# Patient Record
Sex: FEMALE | Race: WHITE | Employment: PART TIME | URBAN - METROPOLITAN AREA
[De-identification: names, ages, dates, MRNs, and addresses within clinical notes are randomized per-mention and may not be internally consistent; named-entity substitution may affect disease eponyms.]

---

## 2020-06-30 ENCOUNTER — HOSPITAL ENCOUNTER (EMERGENCY)
Age: 20
Discharge: HOME | End: 2020-06-30
Attending: EMERGENCY MEDICINE
Payer: COMMERCIAL

## 2020-06-30 VITALS
SYSTOLIC BLOOD PRESSURE: 140 MMHG | BODY MASS INDEX: 18.61 KG/M2 | DIASTOLIC BLOOD PRESSURE: 88 MMHG | OXYGEN SATURATION: 95 % | TEMPERATURE: 97.9 F | RESPIRATION RATE: 18 BRPM | WEIGHT: 105 LBS | HEIGHT: 63 IN | HEART RATE: 68 BPM

## 2020-06-30 PROCEDURE — 99282 EMERGENCY DEPT VISIT SF MDM: CPT

## 2020-06-30 PROCEDURE — 6370000000 HC RX 637 (ALT 250 FOR IP): Performed by: EMERGENCY MEDICINE

## 2020-06-30 RX ORDER — METHYLPREDNISOLONE 4 MG/1
TABLET ORAL
Qty: 1 KIT | Refills: 0 | Status: SHIPPED | OUTPATIENT
Start: 2020-06-30

## 2020-06-30 RX ORDER — HYDROXYZINE HYDROCHLORIDE 25 MG/1
25 TABLET, FILM COATED ORAL ONCE
Status: COMPLETED | OUTPATIENT
Start: 2020-06-30 | End: 2020-06-30

## 2020-06-30 RX ORDER — LEVETIRACETAM 500 MG/1
500 TABLET ORAL 2 TIMES DAILY
COMMUNITY

## 2020-06-30 RX ORDER — PREDNISONE 20 MG/1
20 TABLET ORAL ONCE
Status: COMPLETED | OUTPATIENT
Start: 2020-06-30 | End: 2020-06-30

## 2020-06-30 RX ORDER — FAMOTIDINE 20 MG/1
20 TABLET, FILM COATED ORAL ONCE
Status: COMPLETED | OUTPATIENT
Start: 2020-06-30 | End: 2020-06-30

## 2020-06-30 RX ADMIN — HYDROXYZINE HYDROCHLORIDE 25 MG: 25 TABLET, FILM COATED ORAL at 18:45

## 2020-06-30 RX ADMIN — PREDNISONE 20 MG: 20 TABLET ORAL at 19:56

## 2020-06-30 RX ADMIN — FAMOTIDINE 20 MG: 20 TABLET ORAL at 18:45

## 2020-06-30 ASSESSMENT — ENCOUNTER SYMPTOMS
WHEEZING: 0
SHORTNESS OF BREATH: 1
SORE THROAT: 1
FACIAL SWELLING: 0
TROUBLE SWALLOWING: 1

## 2020-06-30 NOTE — ED PROVIDER NOTES
[facility] ED  eMERGENCY dEPARTMENT eNCOUnter      Pt Name: Masood Jones  MRN: [id number]  [place] 2000  Date of evaluation: 6/30/2020  Provider: Jed Stuart MD    CHIEF COMPLAINT       Chief Complaint   Patient presents with    Allergic Reaction     possible nut oil allergy, mouth tingles, hurts when she swallows         HISTORY OF PRESENT ILLNESS   (Location/Symptom, Timing/Onset,Context/Setting, Quality, Duration, Modifying Factors, Severity)  Note limiting factors. Masood Jones is a 23 y.o. female who presents to the emergency department with subjective symptoms of allergic reaction. After eating some fried chicken from [facility] ([place]) she felt a tingly sore sensation in the mouth and this feels somewhat short of breath. She took some Benadryl without change. Patient has history of peanut allergy diagnosed by testing secondary to family history of peanut allergy. She has never actually had a reaction herself. She checked with the restaurant and they told her that they did not utilize peanut oil or other nut oil product. He has no rash. No swelling to the mouth or face. He states she has had subjective feeling of allergic reaction similar to this but normally could \"calm herself down\" but not tonight. She admits to feeling anxious related to her symptoms. The history is provided by the patient. NursingNotes were reviewed. REVIEW OF SYSTEMS    (2-9 systems for level 4, 10 or more for level 5)     Review of Systems   HENT: Positive for sore throat and trouble swallowing. Negative for facial swelling. Respiratory: Positive for shortness of breath. Negative for wheezing. Skin: Negative for rash. Except as noted above the remainder of the review of systems was reviewed and negative. PAST MEDICAL HISTORY     Past Medical History:   Diagnosis Date    Epilepsy ([address]          SURGICALHISTORY     History reviewed. No pertinent surgical history.       CURRENT MEDICATIONS Previous Medications    LEVETIRACETAM (KEPPRA) 500 MG TABLET    Take 500 mg by mouth 2 times daily       ALLERGIES     Nuts [peanut-containing drug products]    FAMILY HISTORY     History reviewed. No pertinent family history. SOCIAL HISTORY       Social History     Socioeconomic History    Marital status: Single     Spouse name: None    Number of children: None    Years of education: None    Highest education level: None   Occupational History    None   Social Needs    Financial resource strain: None    Food insecurity     Worry: None     Inability: None    Transportation needs     Medical: None     Non-medical: None   Tobacco Use    Smoking status: Never Smoker    Smokeless tobacco: Never Used   Substance and Sexual Activity    Alcohol use: Yes    Drug use: Never    Sexual activity: None   Lifestyle    Physical activity     Days per week: None     Minutes per session: None    Stress: None   Relationships    Social connections     Talks on phone: None     Gets together: None     Attends [employer] service: None     Active member of club or organization: None     Attends meetings of clubs or organizations: None     Relationship status: None    Intimate partner violence     Fear of current or ex partner: None     Emotionally abused: None     Physically abused: None     Forced sexual activity: None   Other Topics Concern    None   Social History Narrative    None       SCREENINGS    Shala Coma Scale  Eye Opening: Spontaneous  Best Verbal Response: Oriented  Best Motor Response: Obeys commands  Alto Coma Scale Score: 15 @FLOW([id number])@      PHYSICAL EXAM    (up to 7 for level 4, 8 or more for level 5)     ED Triage Vitals [06/30/20 1832]   BP Temp Temp Source Pulse Resp SpO2 Height Weight   (!) 140/88 97.9 °F (36.6 °C) Oral 68 18 95 % 5' 3\" (1.6 m) 105 lb (47.6 kg)       Physical Exam  This is a well-developed well-nourished patient without distress. Conjunctivae are clear.   No visible sinus discharge. Oropharynx without angioedema. No facial swelling. No significant facial swelling. Neck supple without visible JVD. Lungs are clear and symmetric without distress. Regular rate and rhythm without murmurs. No acute skin rash. No significant joint swelling or effusions. No leg or ankle edema or signs of phlebitis. Vascular status intact in extremities. Patient is awake alert and appropriate. Patient moves all extremities symmetrically without focal weakness or sensory loss. Mood appears anxious. DIAGNOSTIC RESULTS     EKG: All EKG's are interpreted by the Emergency Department Physician who either signs or Co-signsthis chart in the absence of a cardiologist.        RADIOLOGY:   Vent[facility] Hussar such as CT, Ultrasound and MRI are read by the radiologist. Plain radiographic images are visualized and preliminarily interpreted by the emergency physician with the below findings:        Interpretation per the Radiologist below, if available at the time ofthis note:    No orders to display         ED BEDSIDE ULTRASOUND:   Performed by ED Physician - none    LABS:  Labs Reviewed - No data to display    All other labs were within normal range or not returned as of this dictation. EMERGENCY DEPARTMENT COURSE and DIFFERENTIAL DIAGNOSIS/MDM:   Vitals:    Vitals:    06/30/20 1832   BP: (!) 140/88   Pulse: 68   Resp: 18   Temp: 97.9 °F (36.6 °C)   TempSrc: Oral   SpO2: 95%   Weight: 105 lb (47.6 kg)   Height: 5' 3\" (1.6 m)       Oral Atarax and Pepcid given. Patient will be observed. Patient discussed with Dr. Chris Worthy at shift change for continuity of care. MDM     Patient feels better. There are no symptoms at this juncture. She thinks it was the [place] fried chicken. We will give her a day off of work tomorrow she does not feel good. She has an EpiPen already.   She will return for worsening or weakening especially associated with persistent fevers close time doctor follow-up    CRITICAL CARE TIME   Total Critical Care time was  minutes, excluding separately reportableprocedures. There was a high probability of clinicallysignificant/life threatening deterioration in the patient's condition which required my urgent intervention. CONSULTS:  None    PROCEDURES:  Unless otherwise noted below, none     Procedures    FINAL IMPRESSION      1. Allergic reaction, initial encounter        DISPOSITION/PLAN   DISPOSITION        PATIENT REFERRED TO:  Duke Boyd MD  [address], [address] [phone]    In 3 days  As needed      DISCHARGE MEDICATIONS:  New Prescriptions    METHYLPREDNISOLONE (MEDROL, JOANNA,) 4 MG TABLET    Take by mouth.           (Please note that portions of this note were completed with a voice recognitionprogram.  Efforts were made to edit the dictations but occasionally words are mis-transcribed.)    Jeanine Cooper MD (electronically signed)  Attending Emergency Physician        Jeanine Cooper MD  06/30/20 Gretchen Min MD  06/30/20 [id number]

## 2020-06-30 NOTE — ED TRIAGE NOTES
Patient ate chicken from [facility] about 20 minutes ago, she states after eating the chicken her throat feels sore and mouth tingle, mother called [employer] to check if they use peanut oil they said no. No SOB. Patient tearful and nervous.